# Patient Record
Sex: FEMALE | Race: WHITE | Employment: STUDENT | URBAN - METROPOLITAN AREA
[De-identification: names, ages, dates, MRNs, and addresses within clinical notes are randomized per-mention and may not be internally consistent; named-entity substitution may affect disease eponyms.]

---
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
## 2022-02-03 ENCOUNTER — TRANSCRIBE ORDER (OUTPATIENT)
Dept: SCHEDULING | Age: 22
End: 2022-02-03

## 2022-02-03 DIAGNOSIS — M54.14 THORACIC NEURITIS: Primary | ICD-10-CM

## 2022-02-10 ENCOUNTER — APPOINTMENT (OUTPATIENT)
Dept: GENERAL RADIOLOGY | Age: 22
End: 2022-02-10
Attending: PHYSICIAN ASSISTANT
Payer: COMMERCIAL

## 2022-02-10 ENCOUNTER — HOSPITAL ENCOUNTER (OUTPATIENT)
Dept: MRI IMAGING | Age: 22
Discharge: HOME | End: 2022-02-10
Attending: ORTHOPAEDIC SURGERY
Payer: COMMERCIAL

## 2022-02-10 ENCOUNTER — APPOINTMENT (OUTPATIENT)
Dept: VASCULAR SURGERY | Age: 22
End: 2022-02-10
Attending: PHYSICIAN ASSISTANT
Payer: COMMERCIAL

## 2022-02-10 ENCOUNTER — HOSPITAL ENCOUNTER (EMERGENCY)
Age: 22
Discharge: HOME | End: 2022-02-10
Attending: PEDIATRICS
Payer: COMMERCIAL

## 2022-02-10 VITALS
RESPIRATION RATE: 16 BRPM | OXYGEN SATURATION: 100 % | TEMPERATURE: 97.8 F | HEART RATE: 75 BPM | WEIGHT: 161.82 LBS | DIASTOLIC BLOOD PRESSURE: 75 MMHG | SYSTOLIC BLOOD PRESSURE: 131 MMHG

## 2022-02-10 DIAGNOSIS — M54.14 THORACIC NEURITIS: ICD-10-CM

## 2022-02-10 DIAGNOSIS — R07.9 CHEST PAIN, UNSPECIFIED TYPE: Primary | ICD-10-CM

## 2022-02-10 DIAGNOSIS — Z20.822 ENCOUNTER FOR LABORATORY TESTING FOR COVID-19 VIRUS: ICD-10-CM

## 2022-02-10 DIAGNOSIS — J18.9 COMMUNITY ACQUIRED PNEUMONIA OF RIGHT MIDDLE LOBE OF LUNG: ICD-10-CM

## 2022-02-10 LAB
ALBUMIN SERPL-MCNC: 3.8 G/DL (ref 3.5–5)
ALBUMIN/GLOB SERPL: 1.2 {RATIO} (ref 1.1–2.2)
ALP SERPL-CCNC: 77 U/L (ref 45–117)
ALT SERPL-CCNC: 20 U/L (ref 12–78)
ANION GAP SERPL CALC-SCNC: 3 MMOL/L (ref 5–15)
AST SERPL-CCNC: 19 U/L (ref 15–37)
BASOPHILS # BLD: 0 K/UL (ref 0–0.1)
BASOPHILS NFR BLD: 0 % (ref 0–1)
BILIRUB SERPL-MCNC: 0.2 MG/DL (ref 0.2–1)
BUN SERPL-MCNC: 17 MG/DL (ref 6–20)
BUN/CREAT SERPL: 17 (ref 12–20)
CALCIUM SERPL-MCNC: 9 MG/DL (ref 8.5–10.1)
CHLORIDE SERPL-SCNC: 107 MMOL/L (ref 97–108)
CO2 SERPL-SCNC: 29 MMOL/L (ref 21–32)
COMMENT, HOLDF: NORMAL
CREAT SERPL-MCNC: 0.98 MG/DL (ref 0.55–1.02)
D DIMER PPP FEU-MCNC: <0.19 MG/L FEU (ref 0–0.65)
DIFFERENTIAL METHOD BLD: NORMAL
EOSINOPHIL # BLD: 0 K/UL (ref 0–0.4)
EOSINOPHIL NFR BLD: 0 % (ref 0–7)
ERYTHROCYTE [DISTWIDTH] IN BLOOD BY AUTOMATED COUNT: 12.3 % (ref 11.5–14.5)
GLOBULIN SER CALC-MCNC: 3.2 G/DL (ref 2–4)
GLUCOSE SERPL-MCNC: 94 MG/DL (ref 65–100)
HCG UR QL: NEGATIVE
HCT VFR BLD AUTO: 42.3 % (ref 35–47)
HGB BLD-MCNC: 14.1 G/DL (ref 11.5–16)
IMM GRANULOCYTES # BLD AUTO: 0 K/UL (ref 0–0.04)
IMM GRANULOCYTES NFR BLD AUTO: 0 % (ref 0–0.5)
LYMPHOCYTES # BLD: 1.7 K/UL (ref 0.8–3.5)
LYMPHOCYTES NFR BLD: 23 % (ref 12–49)
MCH RBC QN AUTO: 30.1 PG (ref 26–34)
MCHC RBC AUTO-ENTMCNC: 33.3 G/DL (ref 30–36.5)
MCV RBC AUTO: 90.2 FL (ref 80–99)
MONOCYTES # BLD: 0.8 K/UL (ref 0–1)
MONOCYTES NFR BLD: 11 % (ref 5–13)
NEUTS SEG # BLD: 4.8 K/UL (ref 1.8–8)
NEUTS SEG NFR BLD: 66 % (ref 32–75)
NRBC # BLD: 0 K/UL (ref 0–0.01)
NRBC BLD-RTO: 0 PER 100 WBC
PLATELET # BLD AUTO: 200 K/UL (ref 150–400)
PMV BLD AUTO: 10.5 FL (ref 8.9–12.9)
POTASSIUM SERPL-SCNC: 4 MMOL/L (ref 3.5–5.1)
PROT SERPL-MCNC: 7 G/DL (ref 6.4–8.2)
RBC # BLD AUTO: 4.69 M/UL (ref 3.8–5.2)
SAMPLES BEING HELD,HOLD: NORMAL
SODIUM SERPL-SCNC: 139 MMOL/L (ref 136–145)
TROPONIN-HIGH SENSITIVITY: 11 NG/L (ref 0–51)
TROPONIN-HIGH SENSITIVITY: 11 NG/L (ref 0–51)
WBC # BLD AUTO: 7.4 K/UL (ref 3.6–11)

## 2022-02-10 PROCEDURE — 81025 URINE PREGNANCY TEST: CPT

## 2022-02-10 PROCEDURE — 84484 ASSAY OF TROPONIN QUANT: CPT

## 2022-02-10 PROCEDURE — 72100 X-RAY EXAM L-S SPINE 2/3 VWS: CPT

## 2022-02-10 PROCEDURE — 36415 COLL VENOUS BLD VENIPUNCTURE: CPT

## 2022-02-10 PROCEDURE — 93971 EXTREMITY STUDY: CPT

## 2022-02-10 PROCEDURE — U0005 INFEC AGEN DETEC AMPLI PROBE: HCPCS

## 2022-02-10 PROCEDURE — 80053 COMPREHEN METABOLIC PANEL: CPT

## 2022-02-10 PROCEDURE — 93005 ELECTROCARDIOGRAM TRACING: CPT

## 2022-02-10 PROCEDURE — 85379 FIBRIN DEGRADATION QUANT: CPT

## 2022-02-10 PROCEDURE — 99284 EMERGENCY DEPT VISIT MOD MDM: CPT

## 2022-02-10 PROCEDURE — 72141 MRI NECK SPINE W/O DYE: CPT

## 2022-02-10 PROCEDURE — 71046 X-RAY EXAM CHEST 2 VIEWS: CPT

## 2022-02-10 PROCEDURE — 85025 COMPLETE CBC W/AUTO DIFF WBC: CPT

## 2022-02-10 RX ORDER — AZITHROMYCIN 250 MG/1
TABLET, FILM COATED ORAL
Qty: 6 TABLET | Refills: 0 | Status: SHIPPED | OUTPATIENT
Start: 2022-02-10 | End: 2022-02-15

## 2022-02-11 ENCOUNTER — PATIENT OUTREACH (OUTPATIENT)
Dept: CASE MANAGEMENT | Age: 22
End: 2022-02-11

## 2022-02-11 LAB
ATRIAL RATE: 79 BPM
CALCULATED R AXIS, ECG10: 83 DEGREES
CALCULATED T AXIS, ECG11: 160 DEGREES
DIAGNOSIS, 93000: NORMAL
P-R INTERVAL, ECG05: 116 MS
Q-T INTERVAL, ECG07: 394 MS
QRS DURATION, ECG06: 90 MS
QTC CALCULATION (BEZET), ECG08: 451 MS
SARS-COV-2, COV2: NORMAL
SARS-COV-2, XPLCVT: NOT DETECTED
SOURCE, COVRS: NORMAL
VENTRICULAR RATE, ECG03: 79 BPM

## 2022-02-11 NOTE — ED PROVIDER NOTES
Rafaela Inman is a 25 y.o. female with PMhx of anxiety, asthma who presents to ED with cc of chest discomfort. Patient states she is currently being evaluated by orthopedics (Dr Destinee Vega) and the cardiothoracic's for thoracic outlet syndrome. Patient states over the past few days, she has felt chest tightness, with intermittent chest pain across her chest.  States pain is sometimes pleuritic. She also states that 2 days ago she had cramping in her left calf upon leaving the office and was concerned for DVT. Denies leg swelling, erythema. States she has intermittently had a cough. Patient does note that she has had some lower back pain for a while that seems to have worsened over the past few days and was worse while lying on MRI table. States she feels that she intermittently has tingling in her legs. She denies urinary or fecal incontinence or retention or other abnormalities here and denies saddle paresthesias. Denies fevers, drug use has no history of immunocompromised status or cancer. Denies trauma. Pt denies additional symptoms of F/C, congestion, dysuria, urinary frequency, constipation, diarrhea, abdominal pain, nausea, vomiting. PMHx: Reviewed, as below. PSHx: Reviewed, as below. PCP: Adri Vega MD    There are no other complaints verbalized at this time. Past Medical History:   Diagnosis Date    Anxiety     Asthma        History reviewed. No pertinent surgical history. History reviewed. No pertinent family history.     Social History     Socioeconomic History    Marital status: SINGLE     Spouse name: Not on file    Number of children: Not on file    Years of education: Not on file    Highest education level: Not on file   Occupational History    Not on file   Tobacco Use    Smoking status: Never Smoker    Smokeless tobacco: Never Used   Substance and Sexual Activity    Alcohol use: Yes     Comment: socially    Drug use: Never    Sexual activity: Not on file   Other Topics Concern    Not on file   Social History Narrative    Not on file     Social Determinants of Health     Financial Resource Strain:     Difficulty of Paying Living Expenses: Not on file   Food Insecurity:     Worried About [address] in the Last Year: Not on file    Latha of Food in the Last Year: Not on file   Transportation Needs:     Lack of Transportation (Medical): Not on file    Lack of Transportation (Non-Medical): Not on file   Physical Activity:     Days of Exercise per Week: Not on file    Minutes of Exercise per Session: Not on file   Stress:     Feeling of Stress : Not on file   Social Connections:     Frequency of Communication with Friends and Family: Not on file    Frequency of Social Gatherings with Friends and Family: Not on file    Attends Judaism Services: Not on file    Active Member of [address] [place] [address] or Organizations: Not on file    Attends Club or Organization Meetings: Not on file    Marital Status: Not on file   Intimate Partner Violence:     Fear of Current or Ex-Partner: Not on file    Emotionally Abused: Not on file    Physically Abused: Not on file    Sexually Abused: Not on file   Housing Stability:     Unable to Pay for Housing in the Last Year: Not on file    Number of [place] in the Last Year: Not on file    Unstable Housing in the Last Year: Not on file         ALLERGIES: Patient has no known allergies. Review of Systems   Constitutional: Negative for chills and fever. HENT: Negative for congestion. Respiratory: Positive for cough and shortness of breath. Cardiovascular: Positive for chest pain. Negative for leg swelling. Gastrointestinal: Negative for abdominal pain, constipation, diarrhea, nausea and vomiting. Genitourinary: Negative for dysuria and frequency. Musculoskeletal: Positive for back pain. Neurological: Negative for numbness (no saddle paresthesias).    All other systems reviewed and are negative. Vitals:    02/10/22 1928 02/10/22 1929   BP:  (!) 159/88   Pulse:  89   Resp:  20   Temp:  97.6 °F (36.4 °C)   SpO2:  100%   Weight: 73.4 kg (161 lb 13.1 oz)             Physical Exam  Vitals and nursing note reviewed. Constitutional:       Appearance: Normal appearance. She is not diaphoretic. HENT:      Head: Atraumatic. Right Ear: External ear normal.      Left Ear: External ear normal.   Eyes:      Conjunctiva/sclera: Conjunctivae normal.   Cardiovascular:      Rate and Rhythm: Normal rate and regular rhythm. Heart sounds: Normal heart sounds. No murmur heard. No friction rub. No gallop. Pulmonary:      Effort: No respiratory distress. Breath sounds: Normal breath sounds. No stridor. No wheezing, rhonchi or rales. Abdominal:      General: Bowel sounds are normal. There is no distension. Palpations: Abdomen is soft. Tenderness: There is no abdominal tenderness. There is no right CVA tenderness, left CVA tenderness, guarding or rebound. Hernia: No hernia is present. Musculoskeletal:         General: No swelling or deformity. Cervical back: Normal range of motion. No rigidity or bony tenderness. Thoracic back: No bony tenderness. Lumbar back: Tenderness (generalized across lower back) present. Right lower leg: No edema. Left lower leg: No edema. Comments: No calf tenderness to palpation to bilateral lower extremities. 5/5 strength to flexion/extension to hips, knees and ankles bilaterally. Sensation intact equally to legs. Skin:     General: Skin is warm and dry. Neurological:      Mental Status: She is alert and oriented to person, place, and time. Mental status is at baseline. GCS: GCS eye subscore is 4. GCS verbal subscore is 5. GCS motor subscore is 6. Deep Tendon Reflexes: Babinski sign absent on the right side. Babinski sign absent on the left side.       Reflex Scores:       Patellar reflexes are 2+ on the right side and 2+ on the left side. MDM  Number of Diagnoses or Management Options     Amount and/or Complexity of Data Reviewed  Clinical lab tests: ordered and reviewed  Tests in the radiology section of CPT®: ordered and reviewed  Tests in the medicine section of CPT®: ordered and reviewed  Discuss the patient with other providers: yes (Dr Roz Mark, ED attending)           Procedures          EKG obtained: 19:51  My interpretation:  Rate: 79 bpm. Rhythm: regular. Axis: normal. No STEMI.             11:32 PM  Pt reevaluated. She does note at this time that she has felt that she has had some tender lumps in her right breast.  Denies erythema. RN as chaperone during breast examination. Birthmark noted to left superior lateral breast which patient reports is at her baseline. No abnormalities palpated to left breast.,  Scattered regions of mobile tenderness to right breast.  No noted skin changes or dimpling. No abnormal drainage from bilateral areolas. I have reviewed with them results as obtained thus far and opportunity for questions presented. Pt verbalizes their understanding.       VITAL SIGNS:  Vitals:    02/10/22 1928 02/10/22 1929 02/10/22 2202   BP:  (!) 159/88 131/75   Pulse:  89 78   Resp:  20 16   Temp:  97.6 °F (36.4 °C) 97.8 °F (36.6 °C)   SpO2:  100% 98%   Weight: 73.4 kg (161 lb 13.1 oz)           LABS:  Recent Results (from the past 24 hour(s))   EKG, 12 LEAD, INITIAL    Collection Time: 02/10/22  7:51 PM   Result Value Ref Range    Ventricular Rate 79 BPM    Atrial Rate 79 BPM    P-R Interval 116 ms    QRS Duration 90 ms    Q-T Interval 394 ms    QTC Calculation (Bezet) 451 ms    Calculated R Axis 83 degrees    Calculated T Axis 160 degrees    Diagnosis       Normal sinus rhythm  Lateral infarct , age undetermined  ST & T wave abnormality, consider inferior ischemia  Abnormal ECG  No previous ECGs available     CBC WITH AUTOMATED DIFF    Collection Time: 02/10/22  8:02 PM   Result Value Ref Range    WBC 7.4 3.6 - 11.0 K/uL    RBC 4.69 3.80 - 5.20 M/uL    HGB 14.1 11.5 - 16.0 g/dL    HCT 42.3 35.0 - 47.0 %    MCV 90.2 80.0 - 99.0 FL    MCH 30.1 26.0 - 34.0 PG    MCHC 33.3 30.0 - 36.5 g/dL    RDW 12.3 11.5 - 14.5 %    PLATELET 674 037 - 682 K/uL    MPV 10.5 8.9 - 12.9 FL    NRBC 0.0 0  WBC    ABSOLUTE NRBC 0.00 0.00 - 0.01 K/uL    NEUTROPHILS 66 32 - 75 %    LYMPHOCYTES 23 12 - 49 %    MONOCYTES 11 5 - 13 %    EOSINOPHILS 0 0 - 7 %    BASOPHILS 0 0 - 1 %    IMMATURE GRANULOCYTES 0 0.0 - 0.5 %    ABS. NEUTROPHILS 4.8 1.8 - 8.0 K/UL    ABS. LYMPHOCYTES 1.7 0.8 - 3.5 K/UL    ABS. MONOCYTES 0.8 0.0 - 1.0 K/UL    ABS. EOSINOPHILS 0.0 0.0 - 0.4 K/UL    ABS. BASOPHILS 0.0 0.0 - 0.1 K/UL    ABS. IMM. GRANS. 0.0 0.00 - 0.04 K/UL    DF AUTOMATED     METABOLIC PANEL, COMPREHENSIVE    Collection Time: 02/10/22  8:02 PM   Result Value Ref Range    Sodium 139 136 - 145 mmol/L    Potassium 4.0 3.5 - 5.1 mmol/L    Chloride 107 97 - 108 mmol/L    CO2 29 21 - 32 mmol/L    Anion gap 3 (L) 5 - 15 mmol/L    Glucose 94 65 - 100 mg/dL    BUN 17 6 - 20 MG/DL    Creatinine 0.98 0.55 - 1.02 MG/DL    BUN/Creatinine ratio 17 12 - 20      GFR est AA >60 >60 ml/min/1.73m2    GFR est non-AA >60 >60 ml/min/1.73m2    Calcium 9.0 8.5 - 10.1 MG/DL    Bilirubin, total 0.2 0.2 - 1.0 MG/DL    ALT (SGPT) 20 12 - 78 U/L    AST (SGOT) 19 15 - 37 U/L    Alk. phosphatase 77 45 - 117 U/L    Protein, total 7.0 6.4 - 8.2 g/dL    Albumin 3.8 3.5 - 5.0 g/dL    Globulin 3.2 2.0 - 4.0 g/dL    A-G Ratio 1.2 1.1 - 2.2     TROPONIN-HIGH SENSITIVITY    Collection Time: 02/10/22  8:02 PM   Result Value Ref Range    Troponin-High Sensitivity 11 0 - 51 ng/L   SAMPLES BEING HELD    Collection Time: 02/10/22  8:02 PM   Result Value Ref Range    SAMPLES BEING HELD 1 ex red top     COMMENT        Add-on orders for these samples will be processed based on acceptable specimen integrity and analyte stability, which may vary by analyte.    D DIMER Collection Time: 02/10/22  8:02 PM   Result Value Ref Range    D-dimer <0.19 0.00 - 0.65 mg/L FEU   HCG URINE, QL. - POC    Collection Time: 02/10/22  8:06 PM   Result Value Ref Range    Pregnancy test,urine (POC) Negative NEG     TROPONIN-HIGH SENSITIVITY    Collection Time: 02/10/22  9:58 PM   Result Value Ref Range    Troponin-High Sensitivity 11 0 - 51 ng/L         IMAGING:  XR CHEST PA LAT   Final Result   Possible mild pneumonia in the right middle lobe. XR SPINE LUMB 2 OR 3 V   Final Result   No acute fracture or subluxation. DUPLEX LOWER EXT VENOUS LEFT    (Results Pending)         Medications During Visit:  Medications - No data to display      DECISION MAKING:    Gail Kovacs is a 25 y.o. female who comes in as above. Presents afebrile and hemodynamically stable. Is undergoing work-up by orthopedics and cardiothoracic's for thoracic outlet syndrome. Endorses some lower back pain without acute injury. 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 Denies symptoms indicative of cauda equina syndrome. Neurovascularly intact distally with good strength and good reflexes. X-ray without abnormality noted, will have follow-up with orthopedics. Notes main complaint of cough, chest tightness and intermittent chest pain. Patient notes concern for DVT/PE: duplex and D-dimer negative. CBC, CMP without evidence of anemia, electrolyte abnormality or other acute etiology. Troponin and delta troponin and EKG without acute etiology. Chest x-ray demonstrating possible mild pneumonia of right middle lobe. Will discharge with course of azithromycin. Will obtain Covid test and we have discussed Covid precautions. Just prior to discharge, patient verbalizes that she has had some breast tenderness to right breast. Physical exam demonstrating some regions of tenderness to right breast without evidence of skin change, infectious process, or abnormal discharge. POC negative for pregnancy.   Will have follow-up with gynecology for mammogram.      I have discussed care with ED attending. Pt has been given close return precautions as well as follow up recommendations. Opportunity for questions presented. Pt verbalizes their understanding and agreement with care plan. IMPRESSION:  1. Chest pain, unspecified type    2. Community acquired pneumonia of right middle lobe of lung    3. Encounter for laboratory testing for COVID-19 virus        DISPOSITION:  Discharged      Current Discharge Medication List      START taking these medications    Details   azithromycin (ZITHROMAX) 250 mg tablet Please take 2 doses on the first day (for a total of 500 mg) followed by once daily (250 mg) for 4 days. Qty: 6 Tablet, Refills: 0  Start date: 2/10/2022, End date: 2/15/2022              Follow-up Information     Follow up With Specialties Details Why Contact Info    [address]  EMR DEPT Pediatric Emergency Medicine Go to  As needed, If symptoms worsen Marely Kessler [address]    Kurt Marti MD Orthopedic Surgery Call   [address] [facility]  [address] [facility] [phone]      [facility]  Schedule an appointment as soon as possible for a visit   [address] [facility] [address]    Aj Brunner MD [facility] Medicine Call     [facility] [address] [facility]  [phone]              The patient is asked to follow-up with their primary care provider and any other physicians as above. We have discussed strict return precautions and the patient is asked to return promptly for any increased concerns or worsening of symptoms and for return precautions regarding their symptoms today. They can return to this emergency department or any other emergency department. I have discussed with them results as above and presented opportunity for questions. They verbalize their understanding of the above and agreement with care plan.     Please note that this dictation was completed with [id number], the computer voice recognition software. Quite often unanticipated grammatical, syntax, homophones, and other interpretive errors are inadvertently transcribed by the computer software. Please disregard these errors. Please excuse any errors that have escaped final proofreading.

## 2022-02-11 NOTE — ED TRIAGE NOTES
Triage Note: Pt reports 1 week ago she began with numbness in fingers and joint pain. Pt was referred to ortho who referred her to vascular surgeon. Pt then began with tightness in chest, SOB, and \"hard spots\" in her chest. Yesterday she began with leg cramps of feet and they felt hot.

## 2022-02-11 NOTE — PROGRESS NOTES
2/11/2022  10:01 AM    Patient contacted regarding COVID-19 risk. Discussed COVID-19 related testing which was pending at this time. Test results were pending. Patient informed of results, if available? no.     Patient outreach attempt by this ACM today to perform initial post-discharge assessment. ACM was unable to reach patient today; lvm requesting a return phone call to this ACM.

## 2022-02-11 NOTE — DISCHARGE INSTRUCTIONS
Max dose of Acetaminophen (Tylenol):  4 g per day from all sources. Please note most acetaminophen is dosed in mg. As such, 4000 mg per day is the maximum dosing per day. Please also note that some cold medications contain acetaminophen. Please do not take this high dosing for long periods of time as this can affect your liver. Max dose of ibuprofen (Advil, Aleve):  2400 mg per day from all sources. You can take either 600 mg four times daily or 800 mg three times daily. Please do not exceed this as it can be hard on your kidneys. Please take with a small amount of food if it causes stomach upset. Please do not take this high dosing for long periods of time as this can cause adverse effects, such as ulcers and GI bleeding. We have obtained a Covid test during your visit today. This will take a few days to return. These test results will show up in your MyChart. Directions to access this can be found in your discharge paperwork. During this time, we would like you to treat as if the test is possibly positive including social isolation and distancing, frequent handwashing, cleaning of communal surfaces, not handling food that will be prepared for others, etc.  Please drink plenty of fluids over the next few days and get plenty of rest.  If your symptoms worsen, please seek further evaluation, especially for symptoms such as chest pain or shortness of breath.
No

## 2022-02-15 ENCOUNTER — PATIENT OUTREACH (OUTPATIENT)
Dept: CASE MANAGEMENT | Age: 22
End: 2022-02-15

## 2022-02-15 NOTE — PROGRESS NOTES
2/15/2022  2:37 PM    Pt episode originally resolved on 02/11/2022, however pt sent ACM a MyChart message with questions/concerns yesterday. ACM unresolved episode and called pt back today to discuss these concerns. Patient resolved from [address] Transitions episode on 02/15/2022. Discussed COVID-19 related testing which was available at this time. Test results were negative. Patient informed of results, if available? yes     Patient/family has been provided the following resources and education related to COVID-19:                         Signs, symptoms and red flags related to COVID-19            CDC exposure and quarantine guidelines            [facility] exposure contact - [phone]            Contact for their local Department of Health                 Patient currently reports that the following symptoms have improved:  no new symptoms, no worsening symptoms. ACM recieved MyChart message from pt with questions regarding other symptoms not related to [place]. ACM discussed symptoms/concerns with pt and encouraged pt to follow up with her PCP about these concerns. Pt agrees to plan. No further outreach scheduled with this CTN/ACM/LPN/HC/ MA. Episode of Care resolved. Patient has this CTN/ACM/LPN/HC/MA contact information if future needs arise.

## 2022-02-17 ENCOUNTER — TRANSCRIBE ORDER (OUTPATIENT)
Dept: SCHEDULING | Age: 22
End: 2022-02-17

## 2022-02-17 DIAGNOSIS — N63.0 LUMP OR MASS IN BREAST: Primary | ICD-10-CM

## 2022-02-18 ENCOUNTER — TRANSCRIBE ORDER (OUTPATIENT)
Dept: SCHEDULING | Age: 22
End: 2022-02-18

## 2022-02-23 ENCOUNTER — HOSPITAL ENCOUNTER (OUTPATIENT)
Dept: GENERAL RADIOLOGY | Age: 22
Discharge: HOME | End: 2022-02-23
Attending: FAMILY MEDICINE
Payer: COMMERCIAL

## 2022-02-23 ENCOUNTER — HOSPITAL ENCOUNTER (OUTPATIENT)
Dept: MAMMOGRAPHY | Age: 22
Discharge: HOME | End: 2022-02-23
Attending: STUDENT IN AN ORGANIZED HEALTH CARE EDUCATION/TRAINING PROGRAM
Payer: COMMERCIAL

## 2022-02-23 ENCOUNTER — TRANSCRIBE ORDER (OUTPATIENT)
Dept: GENERAL RADIOLOGY | Age: 22
End: 2022-02-23

## 2022-02-23 DIAGNOSIS — R07.9 CHEST PAIN, UNSPECIFIED: Primary | ICD-10-CM

## 2022-02-23 DIAGNOSIS — N63.0 LUMP OR MASS IN BREAST: ICD-10-CM

## 2022-02-23 DIAGNOSIS — R07.9 CHEST PAIN, UNSPECIFIED: ICD-10-CM

## 2022-02-23 PROCEDURE — 71046 X-RAY EXAM CHEST 2 VIEWS: CPT

## 2022-02-23 PROCEDURE — 76642 ULTRASOUND BREAST LIMITED: CPT
